# Patient Record
Sex: MALE | Race: WHITE | NOT HISPANIC OR LATINO | Employment: FULL TIME | URBAN - METROPOLITAN AREA
[De-identification: names, ages, dates, MRNs, and addresses within clinical notes are randomized per-mention and may not be internally consistent; named-entity substitution may affect disease eponyms.]

---

## 2023-05-19 ENCOUNTER — OFFICE VISIT (OUTPATIENT)
Dept: URGENT CARE | Facility: URGENT CARE | Age: 33
End: 2023-05-19
Payer: COMMERCIAL

## 2023-05-19 ENCOUNTER — ANCILLARY PROCEDURE (OUTPATIENT)
Dept: GENERAL RADIOLOGY | Facility: CLINIC | Age: 33
End: 2023-05-19
Attending: NURSE PRACTITIONER
Payer: COMMERCIAL

## 2023-05-19 VITALS
HEART RATE: 83 BPM | OXYGEN SATURATION: 99 % | WEIGHT: 169 LBS | SYSTOLIC BLOOD PRESSURE: 128 MMHG | TEMPERATURE: 98.1 F | RESPIRATION RATE: 16 BRPM | DIASTOLIC BLOOD PRESSURE: 81 MMHG

## 2023-05-19 DIAGNOSIS — S69.92XA THUMB INJURY, LEFT, INITIAL ENCOUNTER: Primary | ICD-10-CM

## 2023-05-19 PROBLEM — K80.10 CALCULUS OF GALLBLADDER WITH CHRONIC CHOLECYSTITIS WITHOUT OBSTRUCTION: Status: ACTIVE | Noted: 2019-11-12

## 2023-05-19 PROBLEM — R39.89 SENSATION OF PRESSURE IN BLADDER AREA: Status: ACTIVE | Noted: 2023-05-10

## 2023-05-19 PROCEDURE — 73140 X-RAY EXAM OF FINGER(S): CPT | Mod: TC | Performed by: RADIOLOGY

## 2023-05-19 PROCEDURE — 99203 OFFICE O/P NEW LOW 30 MIN: CPT | Performed by: NURSE PRACTITIONER

## 2023-05-19 RX ORDER — SCOLOPAMINE TRANSDERMAL SYSTEM 1 MG/1
1 PATCH, EXTENDED RELEASE TRANSDERMAL
COMMUNITY
Start: 2022-11-07

## 2023-05-19 RX ORDER — CETIRIZINE HYDROCHLORIDE 10 MG/1
10 TABLET ORAL DAILY
COMMUNITY

## 2023-05-19 RX ORDER — DEXTROAMPHETAMINE SACCHARATE, AMPHETAMINE ASPARTATE, DEXTROAMPHETAMINE SULFATE AND AMPHETAMINE SULFATE 3.75; 3.75; 3.75; 3.75 MG/1; MG/1; MG/1; MG/1
1 TABLET ORAL 2 TIMES DAILY
COMMUNITY
Start: 2023-04-18

## 2023-05-19 RX ORDER — HYDROCORTISONE 2.5 %
CREAM (GRAM) TOPICAL
COMMUNITY
Start: 2023-03-17

## 2023-05-19 RX ORDER — TACROLIMUS 1 MG/G
OINTMENT TOPICAL
COMMUNITY
Start: 2023-03-17

## 2023-05-19 RX ORDER — CLOBETASOL PROPIONATE 0.5 MG/ML
SOLUTION TOPICAL
COMMUNITY
Start: 2023-03-17

## 2023-05-19 RX ORDER — BUPROPION HYDROCHLORIDE 150 MG/1
1 TABLET ORAL EVERY MORNING
COMMUNITY
Start: 2022-11-07

## 2023-05-19 RX ORDER — KETOCONAZOLE 20 MG/ML
SHAMPOO TOPICAL
COMMUNITY
Start: 2023-03-17

## 2023-05-19 NOTE — PATIENT INSTRUCTIONS
Continue to monitor closely may use maurice tape or splint for anatomic placement and to help prevent over use.     If symptoms persist > 2 week please see primary or orthopedic

## 2023-05-19 NOTE — PROGRESS NOTES
Assessment & Plan     1. Thumb injury, left, initial encounter    - XR Finger Left G/E 2 Views    No fracture or break noted on x-ray per radiology read tendon appears to be intact CMS is intact we will have him try to decrease use thumb splint was given to him today to use while working he can ice and elevate as needed Tylenol and/or ibuprofen as needed for discomfort.    Patient Instructions   Continue to monitor closely may use buddy tape or splint for anatomic placement and to help prevent over use.     If symptoms persist > 2 week please see primary or orthopedic         SENG Elizalde [facility] URGENT C[facility] [place]    Lanie Phan is a 33 year old male who presents to clinic today for the following health issues:  Chief Complaint   Patient presents with     Urgent Care     Thumb Discomfort     Per patient hyperextended thumb, left hand on April 27th. Digit is still painful, swollen, and lacks full range. Digit has been wrapped every day with kinesiology tape to help reduce swelling and stabilize. Cold therapy was used periodically.     HPI    Presents to clinic states that approximately 1 month ago he was closing a fence door when he hyperextended his left thumb he has noted since this time there has been swelling of the DIP joint he has been using Kinesiotape and icing it on and off.  He states there is minimal pain symptoms of swelling seem to worsen at the end of the day he is right-hand dominant.  Denies numbness or tingling distal or proximal to the swollen joint.      Review of Systems  Constitutional, HEENT, cardiovascular, pulmonary, gi and gu systems are negative, except as otherwise noted.      Objective    /81   Pulse 83   Temp 98.1  F (36.7  C) (Tympanic)   Resp 16   Wt 76.7 kg (169 lb)   SpO2 99%   Physical Exam   GENERAL: healthy, alert and no distress  NECK: no adenopathy, no asymmetry, masses, or scars and thyroid normal to palpation  RESP: lungs  clear to auscultation - no rales, rhonchi or wheezes  CV: regular rate and rhythm, normal S1 S2, no S3 or S4, no murmur, click or rub, no peripheral edema and peripheral pulses strong  MS: Left thumb DIP joint swelling on the dorsal side of thumb  SKIN: no suspicious lesions or rashes    Results for orders placed or performed in visit on 05/19/23   XR Finger Left G/E 2 Views     Status: None    Narrative    FINGER LEFT TWO OR MORE VIEWS   5/19/2023 2:33 PM     HISTORY:  Thumb injury, left, initial encounter. Pain.    COMPARISON: None.      Impression    IMPRESSION: Normal bones, joint spaces and alignment. No fracture. No  evidence of inflammatory arthropathy.     CAROLEE DINERO MD         SYSTEM ID:  [id number]

## 2024-03-09 ENCOUNTER — HEALTH MAINTENANCE LETTER (OUTPATIENT)
Age: 34
End: 2024-03-09

## 2025-02-06 ENCOUNTER — OFFICE VISIT (OUTPATIENT)
Dept: URGENT CARE | Facility: URGENT CARE | Age: 35
End: 2025-02-06
Payer: COMMERCIAL

## 2025-02-06 ENCOUNTER — ANCILLARY PROCEDURE (OUTPATIENT)
Dept: GENERAL RADIOLOGY | Facility: CLINIC | Age: 35
End: 2025-02-06
Payer: COMMERCIAL

## 2025-02-06 VITALS
TEMPERATURE: 98.6 F | HEART RATE: 99 BPM | DIASTOLIC BLOOD PRESSURE: 88 MMHG | OXYGEN SATURATION: 99 % | WEIGHT: 163 LBS | SYSTOLIC BLOOD PRESSURE: 138 MMHG | RESPIRATION RATE: 16 BRPM

## 2025-02-06 DIAGNOSIS — R05.1 ACUTE COUGH: Primary | ICD-10-CM

## 2025-02-06 DIAGNOSIS — R05.1 ACUTE COUGH: ICD-10-CM

## 2025-02-06 DIAGNOSIS — R07.0 THROAT PAIN: ICD-10-CM

## 2025-02-06 LAB
DEPRECATED S PYO AG THROAT QL EIA: NEGATIVE
FLUAV AG SPEC QL IA: NEGATIVE
FLUBV AG SPEC QL IA: NEGATIVE
S PYO DNA THROAT QL NAA+PROBE: NOT DETECTED

## 2025-02-06 RX ORDER — BENZONATATE 200 MG/1
200 CAPSULE ORAL 3 TIMES DAILY PRN
Qty: 30 CAPSULE | Refills: 0 | Status: SHIPPED | OUTPATIENT
Start: 2025-02-06

## 2025-02-06 RX ORDER — FLUTICASONE PROPIONATE 50 MCG
2 SPRAY, SUSPENSION (ML) NASAL
COMMUNITY
Start: 2024-06-20

## 2025-02-06 RX ORDER — PREDNISONE 20 MG/1
20 TABLET ORAL DAILY
Qty: 3 TABLET | Refills: 0 | Status: SHIPPED | OUTPATIENT
Start: 2025-02-06 | End: 2025-02-09

## 2025-02-06 NOTE — PROGRESS NOTES
URGENT CARE  Assessment & Plan   Assessment:   Sylvester Cordova is a 35 year old male who's clinical presentation today is consistent with:   1. Acute cough   - XR Chest 2 Views; Future  - benzonatate (TESSALON) 200 MG capsule;    - predniSONE (DELTASONE) 20 MG tablet;    2. Throat pain  - Streptococcus A Rapid Screen w/Reflex to PCR - Clinic Collect  - Influenza A & B Antigen - Clinic Collect  Plan:  Will treat patient today for acute cough supportively and symptomatically. Patient's chest xray was reassuring, no suspicion (low likelihood) for bacterial infectious lung etiology, patient does not appear to need antibiotics. Will attempt to alleviate patient's symptoms today w/ oral steroids and antitussives; side effects of medications reviewed. Also encouraged patient to continue with OTC cold/flu medications and encouraged fluids and rest.   Additionally we discussed if symptoms do not improve after starting today's treatment to follow up in 7-10 days, sooner if symptoms worsen, return precautions given  No alarm signs or symptoms present   Differential Diagnoses for this patient's chief complaint that I considered include:   Bacterial vs viral etiology of cough, pharyngitis, pneumonia, bronchitis, pertussis, pulmonary embolism, allergic rhinitis/PND/UACS, asthma/COPD exacerbation, postinfectious cough      Patient is agreeable to treatment plan and state they will follow-up if symptoms do not improve and/or if symptoms worsen   see patient's AVS 'monitor for' section for specific patient instructions given and discussed regarding what to watch for and when to follow up    SENG Mendez [facility] CARE [place]      ______________________________________________________________________      Subjective     HPI: Sylvester Cordova  is a 35 year old  male who presents today for evaluation the following concerns:   Patient endorses cough today which started two weeks ago    Patient  reports whole family had influenza A two weeks ago but he never got better    Still having a tickle in his throat and cough, denies any fevers or other symptoms, except runny nose    additionally patient denies a history of asthma or any other past medical history of breathing conditions    Review of Systems:  Pertinent review of systems as reflected in HPI, otherwise negative.     Objective    Physical Exam:  Vitals:    02/06/25 1734   BP: 138/88   Pulse: 99   Resp: 16   Temp: 98.6  F (37  C)   TempSrc: Tympanic   SpO2: 99%   Weight: 73.9 kg (163 lb)      General: Alert and oriented, no acute distress, Vital signs reviewed: afebrile,  normotensive   Psy/mental status: Cooperative, nonanxious  SKIN: Intact, no rashes  EYES: EOMs intact, PERRLA bilaterally   Conjunctiva: Clear bilaterally, no injection or erythema present  EARS: TMs intact, translucent gray in color with normal landmarks present no erythema  or bulging tympanic membrane   Canals are without swelling, however have a mild amount of cerumen, no impaction  NOSE:  mucosa moist               No frontal or maxillary sinus tenderness present bilaterally  MOUTH/THROAT: lips, tongue, & oral mucosa appear normal upon inspection                Posterior oropharynx is erythematous but without exudate, lesions or tonsillar  Edema, no dysphonia, no unilateral tonsillar edema, no uvular deviation,   no signs of peritonsillar abscess  NECK: supple, has full range of motion with no meningeal signs              No lymphadenopathy present  LUNG: normal work of breathing, good respiratory effort without retractions, good air  movement, non labored, inspection reveals normal chest expansion w/  inspiration            Lung sounds are clear to auscultation bilaterally,            No rales/rhonic/crackles wheezing noted           No cough noted     LABS:   Results for orders placed or performed in visit on 02/06/25   Streptococcus A Rapid Screen w/Reflex to PCR - Clinic  Collect     Status: Normal    Specimen: Throat; Swab   Result Value Ref Range    Group A Strep antigen Negative Negative     Imaging:   All images were personally read by this provider (myself).   Per my independent interpretation the xray shows no signs of consolidation or infiltrates suggesting pneumonia     ______________________________________________________________________    I explained my diagnostic considerations and recommendations to the patient, who voiced understanding and agreement with the treatment plan.   All questions were answered.   We discussed potential side effects, risks and benefits of any prescribed or recommended therapies, as well as expectations for response to treatments.  Please see AVS for any patient instructions & handouts given.   Patient was advised to contact the Nurse Care Line, their Primary Care provider, Urgent Care, or the Emergency Department if there are new or worsening symptoms, or call 911 for emergencies.

## 2025-02-07 NOTE — PATIENT INSTRUCTIONS
Diagnosis: acute cough, concern for pneumonia   Today we did:  Will get a chest xray and some lab work    Can go home and will call you when results are done and make plan     Plan:   Try the anti-cough pill and steroids to help with the cough

## 2025-03-16 ENCOUNTER — HEALTH MAINTENANCE LETTER (OUTPATIENT)
Age: 35
End: 2025-03-16